# Patient Record
Sex: MALE | Race: BLACK OR AFRICAN AMERICAN | Employment: OTHER | URBAN - METROPOLITAN AREA
[De-identification: names, ages, dates, MRNs, and addresses within clinical notes are randomized per-mention and may not be internally consistent; named-entity substitution may affect disease eponyms.]

---

## 2017-01-25 ENCOUNTER — HOSPITAL ENCOUNTER (EMERGENCY)
Age: 22
Discharge: LEFT BEFORE DISCHARGE | End: 2017-01-26
Attending: EMERGENCY MEDICINE
Payer: OTHER GOVERNMENT

## 2017-01-25 VITALS
SYSTOLIC BLOOD PRESSURE: 154 MMHG | HEIGHT: 72 IN | DIASTOLIC BLOOD PRESSURE: 106 MMHG | WEIGHT: 230 LBS | HEART RATE: 82 BPM | OXYGEN SATURATION: 100 % | TEMPERATURE: 98.9 F | BODY MASS INDEX: 31.15 KG/M2 | RESPIRATION RATE: 16 BRPM

## 2017-01-25 PROCEDURE — 75810000275 HC EMERGENCY DEPT VISIT NO LEVEL OF CARE

## 2017-01-27 ENCOUNTER — HOSPITAL ENCOUNTER (EMERGENCY)
Age: 22
Discharge: HOME | End: 2017-01-27
Attending: EMERGENCY MEDICINE
Payer: OTHER GOVERNMENT

## 2017-01-27 VITALS
OXYGEN SATURATION: 100 % | HEIGHT: 72 IN | SYSTOLIC BLOOD PRESSURE: 151 MMHG | DIASTOLIC BLOOD PRESSURE: 98 MMHG | TEMPERATURE: 98.7 F | RESPIRATION RATE: 15 BRPM | WEIGHT: 230 LBS | BODY MASS INDEX: 31.15 KG/M2 | HEART RATE: 99 BPM

## 2017-01-27 DIAGNOSIS — A63.0 GENITAL WARTS: Primary | ICD-10-CM

## 2017-01-27 PROCEDURE — 99282 EMERGENCY DEPT VISIT SF MDM: CPT

## 2017-01-28 NOTE — ED PROVIDER NOTES
HPI Comments: 7:23 PM Kalyn Epps is a 19 y.o. male who presents to the ED with complaints of genital bumbs for 2 months. The pt denies penile discharge, dysuria, testicular pain, and use of ETOH, tobacco, and illicit drugs. No further symptoms or complaints expressed at this time. Patient is a 19 y.o. male presenting with skin problem. The history is provided by the patient. Skin Problem           History reviewed. No pertinent past medical history. History reviewed. No pertinent past surgical history. History reviewed. No pertinent family history. Social History     Social History    Marital status: SINGLE     Spouse name: N/A    Number of children: N/A    Years of education: N/A     Occupational History    Not on file. Social History Main Topics    Smoking status: Never Smoker    Smokeless tobacco: Not on file    Alcohol use No    Drug use: No    Sexual activity: Not on file     Other Topics Concern    Not on file     Social History Narrative         ALLERGIES: Review of patient's allergies indicates no known allergies. Review of Systems   Constitutional: Negative for fever. Eyes: Negative for visual disturbance. Respiratory: Negative for shortness of breath. Cardiovascular: Negative for chest pain and leg swelling. Gastrointestinal: Negative for abdominal pain, blood in stool and vomiting. Genitourinary: Negative for discharge, dysuria, flank pain and testicular pain. \"Bump\"    Musculoskeletal: Negative for arthralgias and neck pain. Skin: Negative for rash. Neurological: Negative for headaches. Hematological: Does not bruise/bleed easily. Psychiatric/Behavioral: Negative for confusion. All other systems reviewed and are negative.       Vitals:    01/27/17 1805   BP: (!) 151/98   Pulse: 99   Resp: 15   Temp: 98.7 °F (37.1 °C)   SpO2: 100%   Weight: 104.3 kg (230 lb)   Height: 6' (1.829 m)            Physical Exam   Constitutional:   General: Well-developed, well-nourished, no apparent distress. Head:  Normocephalic atraumatic. Eyes:  Pupils midrange extraocular movements intact. No pallor or conjunctival injection. Nose:  No rhinorrhea, inspection grossly normal.    Ears:  Grossly normal to inspection, no discharge. Mouth:  Mucous membranes moist, no appreciable intraoral lesion. Neck:  Trachea midline, no asymmetry. Chest:  Grossly normal inspection, symmetric chest rise. Skin: 1 Millimeter raised papules just above the waist just RIGHT of midline  : 1 cm diameter raised wart lesion skin colored without erythema or discharge or if there is a 2nd raised skin colored lesion on the base of this penile shaft declined a testicular exam  Extremities:  Grossly normal to inspection, peripheral pulses intact. Neurologic:  Alert and oriented no appreciable focal neurologic deficit. Psychiatric:  Grossly normal mood and affect. Nursing note reviewed, vital signs reviewed. MDM  Number of Diagnoses or Management Options  Elevated blood pressure:   Genital warts:   Diagnosis management comments: ED course:  Patient presents with lesions of the penis and genital area ×1 month, suspect genital warts, has no urethritis-type symptoms we'll refer to health Department urology for further management of his symptoms and testing. Patient's presentation, history, physical exam and laboratory evaluations were reviewed. At this time patient was felt to be stable for outpatient management and follow with primary care/specialist.  Patient was instructed to return to the emergency department with any concerns. Disposition:    Discharged home      Portions of this chart were created with Dragon medical speech to text program.   Unrecognized errors may be present. ED Course       Procedures    Scribe Attestation:   ANDRY LAL am scribing for and in the presence of Judie Zuniga MD on this day 01/27/17 at 7:23 PM radha Cornejo    Signed by: Radha Cornejo. 7:23 PM    Provider Attestation:  I personally performed the services described in the documentation, reviewed the documentation, as recorded by the scribe in my presence, and it accurately and completely records my words and actions.   Shilpi Lim MD. 7:23 PM

## 2017-01-28 NOTE — DISCHARGE INSTRUCTIONS
Genital Warts: Care Instructions  Your Care Instructions  Genital warts are caused by a virus called the human papillomavirus (HPV). They are considered a sexually transmitted infection (STI) because the virus can be spread by sexual contact. The warts often look like small, fleshy bumps or flat, white patches. They can be anywhere in the genital area. You can also be infected with HPV yet not have visible warts. Genital warts often go away on their own without treatment. Some people decide to treat them because of the symptoms or the warts' appearance. Follow-up care is a key part of your treatment and safety. Be sure to make and go to all appointments, and call your doctor if you are having problems. It's also a good idea to know your test results and keep a list of the medicines you take. How can you care for yourself at home? · If your doctor gave you medicine to treat your warts at home, use the medicine exactly as prescribed. Call your doctor if you think you are having a problem with your medicine. · To reduce the itching and irritation from genital warts:  ¨ Take warm baths or wash the area with warm water 3 or 4 times a day. ¨ Keep the warts clean and dry in between baths. You may want to let the sores air dry. This may feel better than a towel. ¨ Do not use over-the-counter wart removal products to treat genital warts. These products are not intended for the genital area and may cause serious burns. To prevent the spread of genital warts  · Be sure to use a latex condom every time you have sex. You can infect someone even if you do not have an obvious wart. · Having one sex partner (who does not have STIs and does not have sex with anyone else) is a good way to avoid STIs. · Wash your hands if you touch the warts. · Talk to your sex partner or partners about genital warts. When should you call for help?   Call your doctor now or seek immediate medical care if:  · A genital wart hurts or spreads. Watch closely for changes in your health, and be sure to contact your doctor if:  · You want further treatment for your genital warts. · You do not get better as expected. Where can you learn more? Go to http://bianca-cristiane.info/. Enter X723 in the search box to learn more about \"Genital Warts: Care Instructions. \"  Current as of: July 28, 2016  Content Version: 11.1  © 20060826-1941 [employer] Care instructions adapted under license by [employer] (which disclaims liability or warranty for this information). If you have questions about a medical condition or this instruction, always ask your healthcare professional. [place] [address] any warranty or liability for your use of this information.